# Patient Record
Sex: MALE | URBAN - METROPOLITAN AREA
[De-identification: names, ages, dates, MRNs, and addresses within clinical notes are randomized per-mention and may not be internally consistent; named-entity substitution may affect disease eponyms.]

---

## 2017-01-18 ENCOUNTER — APPOINTMENT (RX ONLY)
Age: 82
Setting detail: DERMATOLOGY
End: 2017-01-18

## 2017-01-18 DIAGNOSIS — L29.89 OTHER PRURITUS: ICD-10-CM

## 2017-01-18 DIAGNOSIS — L81.7 PIGMENTED PURPURIC DERMATOSIS: ICD-10-CM

## 2017-01-18 PROBLEM — L29.8 OTHER PRURITUS: Status: ACTIVE | Noted: 2017-01-18

## 2017-01-18 PROBLEM — I48.91 UNSPECIFIED ATRIAL FIBRILLATION: Status: ACTIVE | Noted: 2017-01-18

## 2017-01-18 PROCEDURE — 99213 OFFICE O/P EST LOW 20 MIN: CPT

## 2017-01-18 PROCEDURE — ? DIAGNOSIS COMMENT

## 2017-04-24 ENCOUNTER — APPOINTMENT (RX ONLY)
Age: 82
Setting detail: DERMATOLOGY
End: 2017-04-24

## 2017-04-24 DIAGNOSIS — L30.9 DERMATITIS, UNSPECIFIED: ICD-10-CM

## 2017-04-24 PROBLEM — M12.9 ARTHROPATHY, UNSPECIFIED: Status: ACTIVE | Noted: 2017-04-24

## 2017-04-24 PROCEDURE — ? RECOMMENDATIONS

## 2017-04-24 PROCEDURE — ? PRESCRIPTION

## 2017-04-24 PROCEDURE — ? DIAGNOSIS COMMENT

## 2017-04-24 PROCEDURE — 99212 OFFICE O/P EST SF 10 MIN: CPT

## 2017-04-24 RX ORDER — BETAMETHASONE DIPROPIONATE 0.5 MG/G
OINTMENT TOPICAL
Qty: 1 | Refills: 1 | Status: ERX | COMMUNITY
Start: 2017-04-24

## 2017-04-24 RX ORDER — PREDNISONE 20 MG/1
TABLET ORAL
Qty: 21 | Refills: 0 | Status: ERX | COMMUNITY
Start: 2017-04-24

## 2017-04-24 RX ADMIN — BETAMETHASONE DIPROPIONATE: 0.5 OINTMENT TOPICAL at 00:00

## 2017-04-24 RX ADMIN — PREDNISONE: 20 TABLET ORAL at 00:00

## 2017-04-24 NOTE — PROCEDURE: DIAGNOSIS COMMENT
Detail Level: Zone
Comment: Macular erythema with purpuric 1-2 mm macules on the lower abdomen and inner thighs, similar in appearance to previous eruption in December when exhaustive workup revealed several abnormalities which were turned over to PCP. Last note from PCP obtained and list renal insufficiency as an ongoing issue and a persistently elevated TSH. DDx includes secondary pruritus, hypersensitivity dermatitis, and other. Will treat with prednisone, TCS, and antihistamines. If unimproved in 2 weeks will rebiopsy.

## 2017-06-29 ENCOUNTER — APPOINTMENT (RX ONLY)
Age: 82
Setting detail: DERMATOLOGY
End: 2017-06-29

## 2017-06-29 DIAGNOSIS — B02.9 ZOSTER WITHOUT COMPLICATIONS: ICD-10-CM

## 2017-06-29 PROBLEM — L08.9 LOCAL INFECTION OF THE SKIN AND SUBCUTANEOUS TISSUE, UNSPECIFIED: Status: ACTIVE | Noted: 2017-06-29

## 2017-06-29 PROCEDURE — 99213 OFFICE O/P EST LOW 20 MIN: CPT

## 2017-06-29 PROCEDURE — ? DIAGNOSIS COMMENT

## 2017-06-29 PROCEDURE — ? COUNSELING

## 2017-06-29 PROCEDURE — ? PRESCRIPTION

## 2017-06-29 RX ORDER — VALACYCLOVIR HYDROCHLORIDE 1 G/1
TABLET, FILM COATED ORAL
Qty: 21 | Refills: 0 | Status: ERX | COMMUNITY
Start: 2017-06-29

## 2017-06-29 RX ADMIN — VALACYCLOVIR HYDROCHLORIDE: 1 TABLET, FILM COATED ORAL at 00:00

## 2017-06-29 ASSESSMENT — LOCATION ZONE DERM: LOCATION ZONE: TRUNK

## 2017-06-29 ASSESSMENT — LOCATION SIMPLE DESCRIPTION DERM: LOCATION SIMPLE: T11 LEFT POSTERIOR DERMATOME

## 2017-06-29 ASSESSMENT — LOCATION DETAILED DESCRIPTION DERM: LOCATION DETAILED: T11 LEFT POSTERIOR DERMATOME

## 2017-07-03 ENCOUNTER — APPOINTMENT (RX ONLY)
Age: 82
Setting detail: DERMATOLOGY
End: 2017-07-03

## 2017-07-03 DIAGNOSIS — R21 RASH AND OTHER NONSPECIFIC SKIN ERUPTION: ICD-10-CM | Status: IMPROVED

## 2017-07-03 PROCEDURE — ? DIAGNOSIS COMMENT

## 2017-07-03 PROCEDURE — ? PATIENT SPECIFIC COUNSELING

## 2017-07-03 PROCEDURE — 99213 OFFICE O/P EST LOW 20 MIN: CPT

## 2017-07-03 NOTE — PROCEDURE: DIAGNOSIS COMMENT
Comment: His rash is of uncertain etiology and the recent worsening may or may not have been zoster but he is better so will continue to follow.
Detail Level: Simple

## 2017-10-26 NOTE — PROCEDURE: DIAGNOSIS COMMENT
Comment: While this looks like his prior outbreaks, the pain is different and worrisome for  zoster.  Will plan to biopsy if not improved next week.
Detail Level: Zone
lab results/need for outpatient follow-up/radiology results

## 2018-01-22 ENCOUNTER — APPOINTMENT (RX ONLY)
Age: 83
Setting detail: DERMATOLOGY
End: 2018-01-22

## 2018-01-22 DIAGNOSIS — L30.9 DERMATITIS, UNSPECIFIED: ICD-10-CM

## 2018-01-22 PROCEDURE — ? RECOMMENDATIONS

## 2018-01-22 PROCEDURE — ? PRESCRIPTION

## 2018-01-22 PROCEDURE — ? DIAGNOSIS COMMENT

## 2018-01-22 PROCEDURE — 99213 OFFICE O/P EST LOW 20 MIN: CPT

## 2018-01-22 RX ORDER — BETAMETHASONE DIPROPIONATE 0.5 MG/G
OINTMENT TOPICAL
Qty: 1 | Refills: 1 | Status: ERX

## 2018-01-22 NOTE — PROCEDURE: RECOMMENDATIONS
Recommendations (Free Text): betamethasone - apply twice daily at bedtime to rash for 2 weeks for treatment --he was using it intermittently\\n\\nI have recommended Sarna OTC lotion for \"in the moment\" relief, and discussed how betamethasone will reduce the rash/itch over 1-2 weeks\\n\\nIf rash is not better in two weeks, we discussed adding doxycycline as an option
Detail Level: Zone

## 2020-04-18 NOTE — PROCEDURE: RECOMMENDATIONS
Procedure End. Splint application complete.   
Recommendations (Free Text): -zyrtec (over-the-counter) take twice daily\\n-betamethasone - apply twice daily to rash\\n-Prednisone - take 2 tablets in the morning for 1 week, then take 1 tablet in the morning for 1 week.\\n-follow up in 2 weeks
Detail Level: Zone